# Patient Record
Sex: FEMALE | Race: WHITE | NOT HISPANIC OR LATINO | URBAN - METROPOLITAN AREA
[De-identification: names, ages, dates, MRNs, and addresses within clinical notes are randomized per-mention and may not be internally consistent; named-entity substitution may affect disease eponyms.]

---

## 2018-03-12 NOTE — PATIENT DISCUSSION
No Hx of biopsy or pulling of lashes in area, pt reports using eyelash curler but there is no similar issue on left eye.

## 2018-10-31 ENCOUNTER — IMPORTED ENCOUNTER (OUTPATIENT)
Dept: URBAN - METROPOLITAN AREA CLINIC 50 | Facility: CLINIC | Age: 48
End: 2018-10-31

## 2021-04-17 ASSESSMENT — TONOMETRY
OD_IOP_MMHG: 17
OS_IOP_MMHG: 18

## 2021-04-17 ASSESSMENT — VISUAL ACUITY
OD_CC: 20/30+2
OS_PH: 20/30
OS_CC: J1@ 15 IN
OD_CC: J1@ 15 IN
OS_CC: 20/40-1

## 2022-02-28 NOTE — PATIENT DISCUSSION
Use eyelid cleanser with tea tree oil (melaluca) like TranquilEyes (any formula), Oust, Sterilid, Ocusoft (only Allergy) or Blephadex once a day at the base of the eyelids.

## 2022-11-15 NOTE — PROCEDURE NOTE: CLINICAL
PROCEDURE NOTE: Biopsy of Eyelid Right Upper Lid. Diagnosis: Eyelid Lesion, Uncertain Behavior. Anesthesia: Local. Prior to treatment, the risks/benefits/alternatives were discussed. The patient wished to proceed with procedure. The area of the surgical site was prepped surgical scrub. 0.5 cc of 2% lidocaine with epinephrine was injected beneath the lesion. The lesion was excised with Maryann scissors. The defect was cauterized. Erythromycin ointment was placed on the biopsy site. Patient tolerated procedure well. There were no complications. The lesion was placed in formalin and sent to a pathology lab. Post procedure instructions given. Laura Donahue
none